# Patient Record
Sex: MALE | Race: WHITE | NOT HISPANIC OR LATINO | Employment: OTHER | URBAN - METROPOLITAN AREA
[De-identification: names, ages, dates, MRNs, and addresses within clinical notes are randomized per-mention and may not be internally consistent; named-entity substitution may affect disease eponyms.]

---

## 2019-11-03 ENCOUNTER — HOSPITAL ENCOUNTER (EMERGENCY)
Facility: CLINIC | Age: 59
Discharge: HOME | End: 2019-11-03
Attending: EMERGENCY MEDICINE | Admitting: EMERGENCY MEDICINE
Payer: COMMERCIAL

## 2019-11-03 ENCOUNTER — TRANSFERRED RECORDS (OUTPATIENT)
Dept: HEALTH INFORMATION MANAGEMENT | Facility: CLINIC | Age: 59
End: 2019-11-03

## 2019-11-03 ENCOUNTER — APPOINTMENT (OUTPATIENT)
Dept: CT IMAGING | Facility: CLINIC | Age: 59
End: 2019-11-03
Attending: EMERGENCY MEDICINE
Payer: COMMERCIAL

## 2019-11-03 VITALS
HEART RATE: 76 BPM | OXYGEN SATURATION: 97 % | RESPIRATION RATE: 18 BRPM | WEIGHT: 221 LBS | DIASTOLIC BLOOD PRESSURE: 85 MMHG | SYSTOLIC BLOOD PRESSURE: 145 MMHG | TEMPERATURE: 97.9 F

## 2019-11-03 DIAGNOSIS — I16.0 HYPERTENSIVE URGENCY: ICD-10-CM

## 2019-11-03 LAB
ANION GAP SERPL CALCULATED.3IONS-SCNC: 7 MMOL/L (ref 3–14)
BASOPHILS # BLD AUTO: 0 10E9/L (ref 0–0.2)
BASOPHILS NFR BLD AUTO: 0.5 %
BUN SERPL-MCNC: 14 MG/DL (ref 7–30)
CALCIUM SERPL-MCNC: 9.1 MG/DL (ref 8.5–10.1)
CHLORIDE SERPL-SCNC: 105 MMOL/L (ref 94–109)
CO2 SERPL-SCNC: 25 MMOL/L (ref 20–32)
CREAT SERPL-MCNC: 0.79 MG/DL (ref 0.66–1.25)
DIFFERENTIAL METHOD BLD: NORMAL
EOSINOPHIL # BLD AUTO: 0 10E9/L (ref 0–0.7)
EOSINOPHIL NFR BLD AUTO: 0.2 %
ERYTHROCYTE [DISTWIDTH] IN BLOOD BY AUTOMATED COUNT: 12.1 % (ref 10–15)
GFR SERPL CREATININE-BSD FRML MDRD: >90 ML/MIN/{1.73_M2}
GLUCOSE SERPL-MCNC: 117 MG/DL (ref 70–99)
HCT VFR BLD AUTO: 46.3 % (ref 40–53)
HGB BLD-MCNC: 15.4 G/DL (ref 13.3–17.7)
IMM GRANULOCYTES # BLD: 0 10E9/L (ref 0–0.4)
IMM GRANULOCYTES NFR BLD: 0.2 %
LYMPHOCYTES # BLD AUTO: 1.6 10E9/L (ref 0.8–5.3)
LYMPHOCYTES NFR BLD AUTO: 18.7 %
MCH RBC QN AUTO: 29.2 PG (ref 26.5–33)
MCHC RBC AUTO-ENTMCNC: 33.3 G/DL (ref 31.5–36.5)
MCV RBC AUTO: 88 FL (ref 78–100)
MONOCYTES # BLD AUTO: 0.4 10E9/L (ref 0–1.3)
MONOCYTES NFR BLD AUTO: 5.1 %
NEUTROPHILS # BLD AUTO: 6.2 10E9/L (ref 1.6–8.3)
NEUTROPHILS NFR BLD AUTO: 75.3 %
NRBC # BLD AUTO: 0 10*3/UL
NRBC BLD AUTO-RTO: 0 /100
PLATELET # BLD AUTO: 169 10E9/L (ref 150–450)
POTASSIUM SERPL-SCNC: 3.7 MMOL/L (ref 3.4–5.3)
RBC # BLD AUTO: 5.28 10E12/L (ref 4.4–5.9)
SODIUM SERPL-SCNC: 137 MMOL/L (ref 133–144)
TROPONIN I SERPL-MCNC: <0.015 UG/L (ref 0–0.04)
WBC # BLD AUTO: 8.3 10E9/L (ref 4–11)

## 2019-11-03 PROCEDURE — 85025 COMPLETE CBC W/AUTO DIFF WBC: CPT | Performed by: EMERGENCY MEDICINE

## 2019-11-03 PROCEDURE — 99285 EMERGENCY DEPT VISIT HI MDM: CPT | Mod: 25

## 2019-11-03 PROCEDURE — 84484 ASSAY OF TROPONIN QUANT: CPT | Performed by: EMERGENCY MEDICINE

## 2019-11-03 PROCEDURE — 70450 CT HEAD/BRAIN W/O DYE: CPT

## 2019-11-03 PROCEDURE — 25000128 H RX IP 250 OP 636: Performed by: EMERGENCY MEDICINE

## 2019-11-03 PROCEDURE — 80048 BASIC METABOLIC PNL TOTAL CA: CPT | Performed by: EMERGENCY MEDICINE

## 2019-11-03 PROCEDURE — 93005 ELECTROCARDIOGRAM TRACING: CPT

## 2019-11-03 PROCEDURE — 96375 TX/PRO/DX INJ NEW DRUG ADDON: CPT

## 2019-11-03 PROCEDURE — 96374 THER/PROPH/DIAG INJ IV PUSH: CPT

## 2019-11-03 PROCEDURE — 25000132 ZZH RX MED GY IP 250 OP 250 PS 637: Performed by: EMERGENCY MEDICINE

## 2019-11-03 RX ORDER — DIPHENHYDRAMINE HYDROCHLORIDE 50 MG/ML
25 INJECTION INTRAMUSCULAR; INTRAVENOUS ONCE
Status: COMPLETED | OUTPATIENT
Start: 2019-11-03 | End: 2019-11-03

## 2019-11-03 RX ORDER — HYDRALAZINE HYDROCHLORIDE 20 MG/ML
5 INJECTION INTRAMUSCULAR; INTRAVENOUS ONCE
Status: COMPLETED | OUTPATIENT
Start: 2019-11-03 | End: 2019-11-03

## 2019-11-03 RX ORDER — METOCLOPRAMIDE HYDROCHLORIDE 5 MG/ML
10 INJECTION INTRAMUSCULAR; INTRAVENOUS ONCE
Status: COMPLETED | OUTPATIENT
Start: 2019-11-03 | End: 2019-11-03

## 2019-11-03 RX ORDER — AMLODIPINE BESYLATE 2.5 MG/1
2.5 TABLET ORAL DAILY
Qty: 7 TABLET | Refills: 0 | Status: SHIPPED | OUTPATIENT
Start: 2019-11-03 | End: 2023-12-20

## 2019-11-03 RX ORDER — KETOROLAC TROMETHAMINE 15 MG/ML
15 INJECTION, SOLUTION INTRAMUSCULAR; INTRAVENOUS ONCE
Status: COMPLETED | OUTPATIENT
Start: 2019-11-03 | End: 2019-11-03

## 2019-11-03 RX ORDER — AMLODIPINE BESYLATE 5 MG/1
5 TABLET ORAL ONCE
Status: COMPLETED | OUTPATIENT
Start: 2019-11-03 | End: 2019-11-03

## 2019-11-03 RX ORDER — ACETAMINOPHEN 500 MG
1000 TABLET ORAL ONCE
Status: COMPLETED | OUTPATIENT
Start: 2019-11-03 | End: 2019-11-03

## 2019-11-03 RX ADMIN — HYDRALAZINE HYDROCHLORIDE 5 MG: 20 INJECTION INTRAMUSCULAR; INTRAVENOUS at 16:40

## 2019-11-03 RX ADMIN — AMLODIPINE BESYLATE 5 MG: 5 TABLET ORAL at 17:41

## 2019-11-03 RX ADMIN — DIPHENHYDRAMINE HYDROCHLORIDE 25 MG: 50 INJECTION, SOLUTION INTRAMUSCULAR; INTRAVENOUS at 20:54

## 2019-11-03 RX ADMIN — ACETAMINOPHEN 1000 MG: 500 TABLET, FILM COATED ORAL at 19:29

## 2019-11-03 RX ADMIN — KETOROLAC TROMETHAMINE 15 MG: 15 INJECTION, SOLUTION INTRAMUSCULAR; INTRAVENOUS at 20:54

## 2019-11-03 RX ADMIN — METOCLOPRAMIDE 10 MG: 5 INJECTION, SOLUTION INTRAMUSCULAR; INTRAVENOUS at 20:54

## 2019-11-03 ASSESSMENT — ENCOUNTER SYMPTOMS
SHORTNESS OF BREATH: 0
PALPITATIONS: 1
VOMITING: 1
WEAKNESS: 0
SPEECH DIFFICULTY: 0
HEADACHES: 1
NUMBNESS: 0

## 2019-11-03 NOTE — ED PROVIDER NOTES
History     Chief Complaint:  HTN    HPI   Román Drake is a very pleasant 59-year-old male with a history of hypertension who presents to the emergency department accompanied by his wife for evaluation of elevated blood pressure from urgent care.  Per family report, the patient has been maintained on lisinopril, 10 mg daily, for the past 4 to 5 years.  Earlier this summer, he experienced a cough, which in hindsight, felt was related to a viral URI.  Given concern for ACE inhibitor side effect, this was subsequently discontinued and he was transitioned to losartan.  Over the past 2 months, he has been noted to have elevated blood pressures.  Last week, on Thursday, he was transitioned back to lisinopril, though is now taking 20 mg daily.  He typically takes this in the morning.  Over the past few months, the patient has been experiencing intermittent headaches.  He describes them as a throbbing sensation, typically localized to the back of his head, wrapping around towards the front and the forehead.  He correlates these headaches as occurring in conjunction with elevated blood pressure readings.  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  Yesterday, he worked outside raking leaves, and today, developed worsening headache.  His headache is described as similar in location and character to previous headaches.  It was gradual in onset, waxing and waning in intensity over the course of the day.  It was not described as a sudden onset thunderclap headache.  He did experience one episode of emesis earlier today.  He denies associated vision loss, numbness, weakness, or slurred speech.  He has no personal or family history of cerebral aneurysm.  He denies any increased stressors in his life.  He also notes sometimes consuming food exacerbates his headache, though denies significant salt consumption.  He denies any other  associated symptoms including chest pain, chest pressure, palpitations, shortness of breath, or abdominal pain.  No other concerns are voiced at this time.    Allergies:  No Known Drug Allergies     Medications:    Lisinopril    Past Medical History:    HTN    Past Surgical History:    Surgical history reviewed. No pertinent surgical history.     Family History:    Mother: HTN  No familial history of brain aneurysms    Social History:  The patient was accompanied to the ED by wife.  Smoking Status: Never Smoker  Smokeless Tobacco: Never Used  Alcohol Use: Negative  Marital Status:      Review of Systems   Eyes: Negative for visual disturbance.   Respiratory: Negative for shortness of breath.    Cardiovascular: Positive for palpitations. Negative for chest pain.   Gastrointestinal: Positive for vomiting.   Neurological: Positive for headaches. Negative for speech difficulty, weakness and numbness.   All other systems reviewed and are negative.      Physical Exam     Patient Vitals for the past 24 hrs:   BP Temp Temp src Pulse Heart Rate Resp SpO2 Weight   11/03/19 2145 (!) 152/84 -- -- 80 79 15 96 % --   11/03/19 2130 (!) 159/91 -- -- 78 77 16 97 % --   11/03/19 2115 (!) 168/91 -- -- 80 77 17 97 % --   11/03/19 2100 (!) 180/102 -- -- 78 86 17 99 % --   11/03/19 2045 (!) 171/96 -- -- 75 74 17 97 % --   11/03/19 2015 (!) 187/97 -- -- 78 76 11 98 % --   11/03/19 1945 (!) 160/91 -- -- 73 71 18 98 % --   11/03/19 1930 (!) 175/99 -- -- 73 86 14 98 % --   11/03/19 1915 (!) 184/95 -- -- 81 71 12 99 % --   11/03/19 1900 (!) 177/97 -- -- 70 70 14 98 % --   11/03/19 1845 (!) 177/96 -- -- 66 63 28 100 % --   11/03/19 1830 (!) 195/104 -- -- 77 82 22 100 % --   11/03/19 1815 (!) 187/99 -- -- 69 63 -- 100 % --   11/03/19 1800 (!) 187/105 -- -- 71 64 15 100 % --   11/03/19 1745 (!) 200/106 -- -- 82 58 13 -- --   11/03/19 1730 (!) 176/92 -- -- 67 56 16 98 % --   11/03/19 1715 (!) 187/110 -- -- 66 73 15 99 % --   11/03/19 1700  (!) 171/100 -- -- 66 67 16 98 % --   11/03/19 1645 (!) 176/99 -- -- 60 68 29 96 % --   11/03/19 1640 (!) 182/100 -- -- -- -- -- -- --   11/03/19 1630 (!) 182/100 -- -- 69 61 -- 98 % --   11/03/19 1615 (!) 182/103 -- -- 66 72 -- 99 % --   11/03/19 1613 (!) 201/102 97.9  F (36.6  C) Oral 68 -- 16 98 % 100.2 kg (221 lb)      Physical Exam  General:              Well-nourished              Speaking in full sentences   Resting on the gurney, answering questions appropriately  Eyes:              Conjunctiva without injection or scleral icterus              Pupils 3 mm bilaterally, reactive to light  ENT:              Moist mucous membranes              Nares patent              Pinnae normal  Neck:              Full ROM              No stiffness appreciated  Resp:              Lungs CTAB              No crackles, wheezing or audible rubs              Good air movement  CV:                    Normal rate, regular rhythm              S1 and S2 present              No murmur, gallop or rub  GI:              BS present              Abdomen soft without distention              Non-tender to light and deep palpation              No guarding or rebound tenderness  Skin:              Warm, dry, well perfused              No rashes or open wounds on exposed skin  MSK:              Moves all extremities              No focal deformities or swelling  Neuro:              Alert              CN III-XII intact              5/5  strength              5/5 ankle dorsi/plantarflexion              SILT in BUE and BLE              Answers questions appropriately              Moves all extremities equally              Gait stable  Psych:              Normal affect, normal mood    Emergency Department Course     ECG:  ECG taken at 1608, ECG read at 1618  Normal sinus rhythm  Normal ECG  Rate 74 bpm. PA interval 192 ms. QRS duration 102 ms. QT/QTc 408/452 ms. P-R-T axes 49 43 31.    Imaging:  Radiology findings were communicated with the  patient who voiced understanding of the findings.    Head CT w/o contrast  1.  No acute intracranial abnormality  2.  Small moderate right mastoid effusion.  Reading per radiology     Laboratory:  Laboratory findings were communicated with the patient who voiced understanding of the findings.    CBC: WBC 8.3, HGB 15.4,   BMP: glucose 117 (H), o/w WNL (Creatinine 0.79)  Troponin (Collected 1616): <0.015     Interventions:  1640 Apresoline 5 mg IV  1741 Norvasc 5 mg PO  1929 Tylenol 1000 mg PO  2054 Benadryl 25 mg IV  2054 Reglan 10 mg IV  2054 Toradol 15 mg IV    Emergency Department Course:    1609 Nursing notes and vitals reviewed.    1618 I performed an exam of the patient as documented above.     1608 EKG obtained as noted above.     1616 IV was inserted and blood was drawn for laboratory testing, results above.     1710 Patient rechecked and updated.      1907 Patient rechecked and updated.      1956 The patient was sent for a CT while in the emergency department, results above.      2213 Patient rechecked and updated. The patient's headache is resolved.    Impression & Plan      Medical Decision Making:  Román Drake is a 59-year-old male presenting to the ER from urgent care for evaluation of a headache.  VS on presentation notable for elevated BP of 201/102.  Clinical history and exam most consistent with hypertensive urgency.  Patient has had waxing and waning elevated blood pressures over the past few months according to family, with recent dose adjustment and medication change back to lisinopril.  Patient's headache correlates with rises in blood pressure, which are also triggered by working outside the days preceding his headache.  His headache is described as gradual in onset rather than a sudden onset, thunderclap headache.  There is no personal or family history of cerebral aneurysm and I feel this is unlikely to represent subarachnoid hemorrhage.  He denies any symptoms of vision loss, numbness,  weakness to suggest cerebral ischemia.  He also denies any symptoms of chest pain or chest pressure.  EKG demonstrates sinus rhythm without findings of acute ischemia.  His troponin returned undetectable.  Labs demonstrate unremarkable renal function, and are otherwise unremarkable.  During patient's emergency department course, his blood pressure initially and headache remained somewhat persistent.  Noncontrast head CT was obtained, which reveals no evidence of acute intracranial pathology.  We discussed the incidental findings of fluid within right mastoid process, though do not feel this is consistent with mastoiditis.  He was provided 1 dose of hydralazine, as well as 1 dose of oral amlodipine.  He was additionally provided medication to assist with headaches, which he achieved great relief in symptoms from.  His blood pressure also improved in conjunction with this.  I do not feel his headache is consistent with other pathology such as giant cell arteritis given the above history and ED course.  Additionally, this is not consistent with meningitis nor encephalitis.  I discussed with the patient and wife present at bedside the importance of close follow-up.  There open to establishing care with a internal medicine specialist.  Referral information was provided for [facility] options as well as within the [facility].   At this time, I feel it would be reasonable to continue on lisinopril as previously prescribed, he will also begin low-dose amlodipine.  I discussed with the family side effects of this medication including peripheral edema.  They are to follow-up early this coming week in 1 to 2 days for recheck of blood pressure, and reevaluation of symptoms with their primary care physician.  Further dose adjustments or medication adjustments should be at the direction of their PCP.  Both the patient and wife verbalized understanding of this and felt comfortable with this plan of care.  Given the  improvement in symptoms, improvements in blood pressure, and results of the above studies, with reasonable clinical certainty I feel patient is stable for discharge home with very close outpatient follow-up.  Strict return precautions discussed including persistently elevated blood pressure readings greater than 180/100, or recurrent symptoms including severe headache, chest pain, new neurologic deficits, or any other concerns.  All of their questions were answered prior to discharge.    Diagnosis:    ICD-10-CM    1. Hypertensive urgency I16.0      Disposition:   The patient is discharged to home.     Discharge Medications:  New Prescriptions    AMLODIPINE (NORVASC) 2.5 MG TABLET    Take 1 tablet (2.5 mg) by mouth daily for 7 days     Scribe Disclosure:  I, [place] Meenakshi, am serving as a scribe at 4:16 PM on 11/3/2019 to document services personally performed by Jose Calixto MD based on my observations and the provider's statements to me.  [facility] EMERGENCY DEPARTMENT       Jose Calixto MD  11/03/19 5439       Jose Calixto MD  11/03/19 3701

## 2019-11-03 NOTE — DISCHARGE INSTRUCTIONS
Follow-up:  Please follow-up with your primary care provider in 2 days for re-evaluation and discussion of your visit to the emergency department today.    Please contact the [facility] as recommended tomorrow for close follow-up    Home treatments:  Recommended home therapies include continue with BP log, take medications as directed and close follow-up.    New prescriptions:  Amlodipine    Return precautions:  Warning signs which should prompt you to return to the ER include worsening headache, vomiting, vision changes, numbness/weakness, or any other new or troubling symptoms.  We are always happy to see you again.    Discharge Instructions  Hypertension - High Blood Pressure    During you visit to the Emergency Department, your blood pressure was higher than the recommended blood pressure.  This may be related to stress, pain, medication or other temporary conditions. In these cases, your blood pressure may return to normal on its own. If you have a history of high blood pressure, you may need to have your provider adjust your medications. Sometimes, your high measurement here may indicate that you have developed high blood pressure that will stay high unless it is treated. As a general rule, high blood pressure causes problems over years rather than days, weeks, or months. So, while it is important to treat blood pressure, it is rarely important to treat blood pressure immediately. Occasionally we will begin a medication in the Emergency Department; more often we will recommend close follow-up for medications with a primary doctor/clinic.    Generally, every Emergency Department visit should have a follow-up clinic visit with either a primary or a specialty clinic/provider. Please follow-up as instructed by your emergency provider today.    Return to the Emergency Department if you start to have:  A severe headache.  Chest pain.  Shortness of breath.  Weakness or numbness that affects one part of the  body.  Confusion.  Vision changes.  Significant swelling of legs and/or eyes.  A reaction to any medication started in the Emergency Department.    What can I do to help myself?  Avoid alcohol.  Take any blood pressure medicine that you are prescribed.  Get a good night s sleep.  Lower your salt intake.  Exercise.  Lose weight.  Manage stress.  See your doctor regularly    If blood pressure medication was started in the Emergency Department:  The medicine may not have an immediate effect. The body and brain determine what blood pressure you have. The medicine s job is to retrain the body s  thermostat  to a lower blood pressure.  You will need to follow up with your provider to see how this medicine is working for you.  If you were given a prescription for medicine here today, be sure to read all of the information (including the package insert) that comes with your prescription.  This will include important information about the medicine, its side effects, and any warnings that you need to know about.  The pharmacist who fills the prescription can provide more information and answer questions you may have about the medicine.  If you have questions or concerns that the pharmacist cannot address, please call or return to the Emergency Department.   Remember that you can always come back to the Emergency Department if you are not able to see your regular provider in the amount of time listed above, if you get any new symptoms, or if there is anything that worries you.

## 2019-11-03 NOTE — ED AVS SNAPSHOT
[facility] Emergency Department  [address]  [facility] [address]  Phone:  [phone]  Fax:  [phone]                                    Román Drake   MRN: [id number]    Department:  [facility] Emergency Department   Date of Visit:  11/3/2019           After Visit Summary Signature Page    I have received my discharge instructions, and my questions have been answered. I have discussed any challenges I see with this plan with the nurse or doctor.    ..........................................................................................................................................  Patient/Patient Representative Signature      ..........................................................................................................................................  Patient Representative Print Name and Relationship to Patient    ..................................................               ................................................  Date                                   Time    ..........................................................................................................................................  Reviewed by Signature/Title    ...................................................              ..............................................  Date                                               Time          22EPIC Rev 08/18

## 2019-11-04 LAB — INTERPRETATION ECG - MUSE: NORMAL

## 2021-10-02 NOTE — ED TRIAGE NOTES
"59 year old male complains of \"splitting\" headache, sent from  for hypertension. GCS 15. ABCs intact.    Zeynep Wong RN on 11/3/2019 at 4:13 PM    "
generalized weakness

## 2023-10-27 ENCOUNTER — NURSE TRIAGE (OUTPATIENT)
Dept: NURSING | Facility: CLINIC | Age: 63
End: 2023-10-27
Payer: COMMERCIAL

## 2023-10-28 NOTE — TELEPHONE ENCOUNTER
"Román reports Rt Upper Abdominal pain that started ~7:30 pm and has progressively worsened    - Rt abdomen, just under rib cage  - Radiates to back  - per spouse, \"doubled over in pain\"    ER advised -  [facility] suggested  They are looking into in-network facilities with their insurance    KEVIN Vaz [facility] Nurse [facility]visors      Reason for Disposition   [1] Pain lasts > 10 minutes AND [2] age > 50    Additional Information   Negative: SEVERE difficulty breathing (e.g., struggling for each breath, speaks in single words)   Negative: Shock suspected (e.g., cold/pale/clammy skin, too weak to stand, low BP, rapid pulse)   Negative: Difficult to awaken or acting confused (e.g., disoriented, slurred speech)   Negative: Passed out (i.e., lost consciousness, collapsed and was not responding)   Negative: Visible sweat on face or sweat dripping down face   Negative: Sounds like a life-threatening emergency to the triager   Negative: [1] SEVERE pain (e.g., excruciating) AND [2] present > 1 hour    Protocols used: Abdominal Pain - Upper-A-AH    "

## 2023-10-30 ENCOUNTER — ANCILLARY PROCEDURE (OUTPATIENT)
Dept: RADIOLOGY | Facility: CLINIC | Age: 63
End: 2023-10-30
Payer: COMMERCIAL

## 2023-11-01 ENCOUNTER — TRANSCRIBE ORDERS (OUTPATIENT)
Dept: OTHER | Age: 63
End: 2023-11-01

## 2023-11-01 DIAGNOSIS — K81.9 CHOLECYSTITIS: Primary | ICD-10-CM

## 2023-11-02 ENCOUNTER — TELEPHONE (OUTPATIENT)
Dept: SURGERY | Facility: CLINIC | Age: 63
End: 2023-11-02

## 2023-11-02 ENCOUNTER — HOSPITAL ENCOUNTER (OUTPATIENT)
Facility: AMBULATORY SURGERY CENTER | Age: 63
End: 2023-11-02
Attending: SURGERY

## 2023-11-02 ENCOUNTER — OFFICE VISIT (OUTPATIENT)
Dept: SURGERY | Facility: CLINIC | Age: 63
End: 2023-11-02

## 2023-11-02 VITALS — WEIGHT: 200 LBS

## 2023-11-02 DIAGNOSIS — K81.9 CHOLECYSTITIS: ICD-10-CM

## 2023-11-02 PROCEDURE — 99243 OFF/OP CNSLTJ NEW/EST LOW 30: CPT | Performed by: SURGERY

## 2023-11-02 RX ORDER — ASPIRIN 81 MG/1
81 TABLET ORAL DAILY
COMMUNITY

## 2023-11-02 RX ORDER — CHLORAL HYDRATE 500 MG
2000 CAPSULE ORAL
COMMUNITY

## 2023-11-02 RX ORDER — ACETAMINOPHEN 325 MG/1
975 TABLET ORAL ONCE
Status: CANCELLED | OUTPATIENT
Start: 2023-11-02 | End: 2023-11-02

## 2023-11-02 RX ORDER — BUTALB/ACETAMINOPHEN/CAFFEINE 50-325-40
2 TABLET ORAL DAILY
COMMUNITY

## 2023-11-02 RX ORDER — LISINOPRIL 20 MG/1
10 TABLET ORAL DAILY
COMMUNITY
Start: 2023-07-13 | End: 2024-07-12

## 2023-11-02 NOTE — LETTER
11/2/2023         RE: Román Drake  [address] [facility] [address]        Dear Colleague,    Thank you for referring your patient, Román Drake, to the [facility] [place]. Please see a copy of my visit note below.    HPI: Román Drake is a 63 year old male referred to see me by Clinic, [facility] for biliary colic.  He notes several days of waxing and waning right upper quadrant pain that began this past Friday.  With each episode it tended to occur following a meal, with nausea present during more severe bouts of the pain.  Since eliminating fatty foods from his diet over the past few days he has not had any further symptoms.  He did note that when urinating on Saturday night he had a slight increase in the right upper quadrant pain, but no penile pain.  He did present to the urgent care for evaluation where he underwent both urinalysis and ultrasound evaluation, which was noteworthy for gallstones.    Allergies:Patient has no known allergies.    Prior Medical History: History reviewed. No pertinent past medical history.    Prior Surgical History:   Past Surgical History:   Procedure Laterality Date     C ANESTH,EAR SURGERY      age 7       CURRENT MEDS:  Prior to Admission medications    Medication Sig Start Date End Date Taking? Authorizing Provider   aspirin 81 MG EC tablet Take 81 mg by mouth daily   Yes Reported, Patient   Coenzyme Q10 (CO Q-10) 30 MG CAPS Take 2 capsules by mouth daily   Yes Reported, Patient   lisinopril (ZESTRIL) 20 MG tablet Take 10 mg by mouth daily 7/13/23 7/12/24 Yes Reported, Patient   lysine 500 MG TABS    Yes Reported, Patient   amLODIPine (NORVASC) 2.5 MG tablet Take 1 tablet (2.5 mg) by mouth daily for 7 days 11/3/19 11/10/19  Jose Calixto MD   fish oil-omega-3 fatty acids 1000 MG capsule Take 2,000 mg by mouth  Patient not taking: Reported on 11/2/2023    Reported, Patient         History  reviewed. No pertinent family history.     reports that he has never smoked. He has never used smokeless tobacco. He reports that he does not currently use alcohol.    History   Smoking Status     Never   Smokeless Tobacco     Never       Review of Systems -    The 10 point review of systems is within normal limits except as noted in HPI.    Wt 90.7 kg (200 lb)   200 lbs 0 oz  There is no height or weight on file to calculate BMI.    EXAM:  GENERAL: Alert, cooperative, appears stated age  ABDOMEN: Soft, nondistended.  Slight discomfort in the right upper quadrant just below the right costal margin  Heart: Regular rate and rhythm  Lungs: Normal respiratory effort    LABS:  Lab Results   Component Value Date    HGB 15.4 11/03/2019    WBC 8.3 11/03/2019     11/03/2019           Assessment/Plan:   1. Cholecystitis          Román Drake is a 63 year old male with signs and symptoms consistent with biliary colic.  I have explained the pathophysiology of gallstone formation and progression to symptomatic disease in detail as well as the surgical versus non-operative management strategies.      The risks of surgery were discussed in detail which include, but are not limited to, bleeding, infection, injury to surrounding structures.  Additionally, the risks of non operative management were discussed which include, but are not limited to, recurrent pain or worsening symptoms    He understands everything which was discussed and has consented to proceed with a laparoscopic cholecystectomy.  Surgery will be scheduled at a time that works for the patient.      20 minutes spent on the date of the encounter doing chart review, review of outside records, patient visit, and documentation    Jean Gilliland MD ,MD  [facility] Department of Surgery      Again, thank you for allowing me to participate in the care of your patient.        Sincerely,        Jean Gilliland MD

## 2023-11-02 NOTE — TELEPHONE ENCOUNTER
Spoke with patient today regarding surgery scheduling     Went over details/instructions.    Surgery Letter given to patient in clinic.  (Please see LETTERS TAB in chart to retrieve a copy of this letter)

## 2023-11-02 NOTE — LETTER
Pre-op Physical: To be done by Dr Gilliland day of procedure.    Surgery Date: 11/27/2023     Location: [place] [address]    Approximate Arrival Time: 11:00am  (Unless instructed differently by the pre-op call nurse)     Pre-Surgical Tasks:     Review all medications with your primary care or prescribing physician; they will advise you which meds to stop and when, and when you can resume taking.  Certain medications like blood thinners and weight loss medications need to be stopped in advance of surgery to proceed safely.      Blood thinners including but not exclusive to drugs like Xarelto, Eliquis, Warfarin and Aspirin, should be stopped five days before surgery, if your prescribing provider agrees. Follow your provider's advice on stopping blood thinners because they know you best.  If you are unsure if your medication is a blood thinner, ask your prescribing provider.    Weight loss medications: There are multiple medications being used for weight management and diabetes today, and the list is growing.  Phentermine, Ozempic, Wegovy, Trulicity, and other similar medications need to be stopped one week before surgery to avoid being cancelled.  Victoza and Saxenda can be continued longer but must be stopped one full day before surgery.  Please ask your prescribing provider for advice.    Diabetic medications: in addition to the medications talked about above that are used for either weight loss or diabetes, some people are on insulin that may require adjustment.  Please discuss managing diabetic medications with your prescribing doctor as these medications may require modification prior to surgery.     Please shower the evening before and morning of surgery with Hibiclens soap.  This can be found at your local pharmacy.     Fasting instructions will be provided by the pre-op nurse who will call you 1-3 days before surgery.  Typically, we advise normal food  up to 8 hours before you arrive for surgery. Clear liquids only from then until 2 hours before you arrive surgery, then nothing at all by mouth.  The nurse will review your specific instructions with you at the call.      Smoking impacts your body's ability to heal properly so we advise patients to quit if possible before surgery.  Plastic Surgery patients are required to be nicotine free for at least 8 weeks before surgery.      You will need an adult to drive you home and stay with you 24 hours after surgery. Public transportation or Medical Van Services are not permitted.    Visitor restrictions are subject to change, please verify with the pre-op nurse when they call how many people are permitted to accompany you.    We always encourage you to notify your insurance any time you have medical tests or procedures scheduled including surgery. The number is usually right on the back of your insurance card. To obtain pricing for surgery, please call  [facility] Cost of Care at [phone] or email [email or web address].        Call our office if you have any questions! Thank you!

## 2023-11-02 NOTE — LETTER
Pre-op Physical: To be done by Dr Gilliland day of procedure.    Surgery Date: 12/20/2023     Location: [place] [address]    Approximate Arrival Time: 12:45 (Unless instructed differently by the pre-op call nurse)     Pre-Surgical Tasks:     Review all medications with your primary care or prescribing physician; they will advise you which meds to stop and when, and when you can resume taking.  Certain medications like blood thinners and weight loss medications need to be stopped in advance of surgery to proceed safely.      Blood thinners including but not exclusive to drugs like Xarelto, Eliquis, Warfarin and Aspirin, should be stopped five days before surgery, if your prescribing provider agrees. Follow your provider's advice on stopping blood thinners because they know you best.  If you are unsure if your medication is a blood thinner, ask your prescribing provider.    Weight loss medications: There are multiple medications being used for weight management and diabetes today, and the list is growing.  Phentermine, Ozempic, Wegovy, Trulicity, and other similar medications need to be stopped one week before surgery to avoid being cancelled.  Victoza and Saxenda can be continued longer but must be stopped one full day before surgery.  Please ask your prescribing provider for advice.    Diabetic medications: in addition to the medications talked about above that are used for either weight loss or diabetes, some people are on insulin that may require adjustment.  Please discuss managing diabetic medications with your prescribing doctor as these medications may require modification prior to surgery.     Please shower the evening before and morning of surgery with Hibiclens soap.  This can be found at your local pharmacy.     Fasting instructions will be provided by the pre-op nurse who will call you 1-3 days before surgery.  Typically, we advise normal food up  to 8 hours before you arrive for surgery. Clear liquids only from then until 2 hours before you arrive surgery, then nothing at all by mouth.  The nurse will review your specific instructions with you at the call.      Smoking impacts your body's ability to heal properly so we advise patients to quit if possible before surgery.  Plastic Surgery patients are required to be nicotine free for at least 8 weeks before surgery.      You will need an adult to drive you home and stay with you 24 hours after surgery. Public transportation or Medical Van Services are not permitted.    Visitor restrictions are subject to change, please verify with the pre-op nurse when they call how many people are permitted to accompany you.    We always encourage you to notify your insurance any time you have medical tests or procedures scheduled including surgery. The number is usually right on the back of your insurance card. To obtain pricing for surgery, please call [facility] Cost of Care at [phone] or email [email or web address].        Call our office if you have any questions! Thank you!

## 2023-11-02 NOTE — PROGRESS NOTES
HPI: Román Drake is a 63 year old male referred to see me by Clinic, [facility] for biliary colic.  He notes several days of waxing and waning right upper quadrant pain that began this past Friday.  With each episode it tended to occur following a meal, with nausea present during more severe bouts of the pain.  Since eliminating fatty foods from his diet over the past few days he has not had any further symptoms.  He did note that when urinating on Saturday night he had a slight increase in the right upper quadrant pain, but no penile pain.  He did present to the urgent care for evaluation where he underwent both urinalysis and ultrasound evaluation, which was noteworthy for gallstones.    Allergies:Patient has no known allergies.    Prior Medical History: History reviewed. No pertinent past medical history.    Prior Surgical History:   Past Surgical History:   Procedure Laterality Date    ZZC ANESTH,EAR SURGERY      age 7       CURRENT MEDS:  Prior to Admission medications    Medication Sig Start Date End Date Taking? Authorizing Provider   aspirin 81 MG EC tablet Take 81 mg by mouth daily   Yes Reported, Patient   Coenzyme Q10 (CO Q-10) 30 MG CAPS Take 2 capsules by mouth daily   Yes Reported, Patient   lisinopril (ZESTRIL) 20 MG tablet Take 10 mg by mouth daily 7/13/23 7/12/24 Yes Reported, Patient   lysine 500 MG TABS    Yes Reported, Patient   amLODIPine (NORVASC) 2.5 MG tablet Take 1 tablet (2.5 mg) by mouth daily for 7 days 11/3/19 11/10/19  Jose Calixto MD   fish oil-omega-3 fatty acids 1000 MG capsule Take 2,000 mg by mouth  Patient not taking: Reported on 11/2/2023    Reported, Patient         History reviewed. No pertinent family history.     reports that he has never smoked. He has never used smokeless tobacco. He reports that he does not currently use alcohol.    History   Smoking Status    Never   Smokeless Tobacco    Never       Review of Systems -    The 10 point review of  systems is within normal limits except as noted in HPI.    Wt 90.7 kg (200 lb)   200 lbs 0 oz  There is no height or weight on file to calculate BMI.    EXAM:  GENERAL: Alert, cooperative, appears stated age  ABDOMEN: Soft, nondistended.  Slight discomfort in the right upper quadrant just below the right costal margin  Heart: Regular rate and rhythm  Lungs: Normal respiratory effort    LABS:  Lab Results   Component Value Date    HGB 15.4 11/03/2019    WBC 8.3 11/03/2019     11/03/2019           Assessment/Plan:   1. Cholecystitis          Román Drake is a 63 year old male with signs and symptoms consistent with biliary colic.  I have explained the pathophysiology of gallstone formation and progression to symptomatic disease in detail as well as the surgical versus non-operative management strategies.      The risks of surgery were discussed in detail which include, but are not limited to, bleeding, infection, injury to surrounding structures.  Additionally, the risks of non operative management were discussed which include, but are not limited to, recurrent pain or worsening symptoms    He understands everything which was discussed and has consented to proceed with a laparoscopic cholecystectomy.  Surgery will be scheduled at a time that works for the patient.      20 minutes spent on the date of the encounter doing chart review, review of outside records, patient visit, and documentation    Jean Gilliland MD ,MD  [facility] Department of Surgery

## 2023-11-10 NOTE — TELEPHONE ENCOUNTER
"Received the following information from [facility] via email:    \"Pt's insurnace is a [employer] [facility] Medical Sharing \"medi-share is not insurance, it does not guarantee that eligible medical bills will be shared or otherwise paid. Medi-share is not a discount card or program\"    If pt wants to proceed at [facility], pt surgery will be self-pay and pt can submit for payment from [employer]. Amount due will be $1365.00. The hospital may have different options for billing. Please connect with pt and see how pt wants to proceed.\"    I called and spoke with patient about the above information.  Reading to him verbatim what the entire message.  Patient is aware the plan is a cost sharing plan and he will be responsible for \"some form of payment\".  Relayed 2x that the amount due will be 1365.00 at time of service.  That [facility] does not offer any sort of payment plans that I'm aware of and that payment will be due in full at time of check in.  Patient expressed understanding several times, thanked for the information and stated he will have payment in full prepared at time of check in. He does wish at this time to proceed as scheduled.    Call was ended.    "

## 2023-11-21 NOTE — TELEPHONE ENCOUNTER
"Patient reached out to me this afternoon after I received and email from [facility] that he needed to cancel his procedure.  Patient states at this time he has \"had some things come up and 11/27 will not work for him\".  Was able to get patient rescheduled with Dr Gilliland for 12/20/2023.  New letter sent via US Mail.  "

## 2023-12-19 ENCOUNTER — ANESTHESIA EVENT (OUTPATIENT)
Dept: SURGERY | Facility: AMBULATORY SURGERY CENTER | Age: 63
End: 2023-12-19

## 2023-12-20 ENCOUNTER — ANESTHESIA (OUTPATIENT)
Dept: SURGERY | Facility: AMBULATORY SURGERY CENTER | Age: 63
End: 2023-12-20

## 2023-12-20 ENCOUNTER — HOSPITAL ENCOUNTER (OUTPATIENT)
Facility: AMBULATORY SURGERY CENTER | Age: 63
Discharge: HOME | End: 2023-12-20
Attending: SURGERY

## 2023-12-20 VITALS
TEMPERATURE: 97.1 F | OXYGEN SATURATION: 96 % | BODY MASS INDEX: 28.63 KG/M2 | HEIGHT: 70 IN | SYSTOLIC BLOOD PRESSURE: 171 MMHG | HEART RATE: 81 BPM | RESPIRATION RATE: 16 BRPM | WEIGHT: 200 LBS | DIASTOLIC BLOOD PRESSURE: 79 MMHG

## 2023-12-20 DIAGNOSIS — K81.9 CHOLECYSTITIS: ICD-10-CM

## 2023-12-20 PROCEDURE — 47562 LAPAROSCOPIC CHOLECYSTECTOMY: CPT | Performed by: SURGERY

## 2023-12-20 RX ORDER — PROPOFOL 10 MG/ML
INJECTION, EMULSION INTRAVENOUS PRN
Status: DISCONTINUED | OUTPATIENT
Start: 2023-12-20 | End: 2023-12-20

## 2023-12-20 RX ORDER — PROPOFOL 10 MG/ML
INJECTION, EMULSION INTRAVENOUS CONTINUOUS PRN
Status: DISCONTINUED | OUTPATIENT
Start: 2023-12-20 | End: 2023-12-20

## 2023-12-20 RX ORDER — LIDOCAINE HYDROCHLORIDE 20 MG/ML
INJECTION, SOLUTION INFILTRATION; PERINEURAL PRN
Status: DISCONTINUED | OUTPATIENT
Start: 2023-12-20 | End: 2023-12-20

## 2023-12-20 RX ORDER — BUPIVACAINE HYDROCHLORIDE 2.5 MG/ML
INJECTION, SOLUTION INFILTRATION; PERINEURAL PRN
Status: DISCONTINUED | OUTPATIENT
Start: 2023-12-20 | End: 2023-12-20 | Stop reason: HOSPADM

## 2023-12-20 RX ORDER — ONDANSETRON 4 MG/1
4 TABLET, ORALLY DISINTEGRATING ORAL EVERY 30 MIN PRN
Status: DISCONTINUED | OUTPATIENT
Start: 2023-12-20 | End: 2023-12-21 | Stop reason: HOSPADM

## 2023-12-20 RX ORDER — ONDANSETRON 2 MG/ML
4 INJECTION INTRAMUSCULAR; INTRAVENOUS EVERY 30 MIN PRN
Status: DISCONTINUED | OUTPATIENT
Start: 2023-12-20 | End: 2023-12-21 | Stop reason: HOSPADM

## 2023-12-20 RX ORDER — GLYCOPYRROLATE 0.2 MG/ML
INJECTION, SOLUTION INTRAMUSCULAR; INTRAVENOUS PRN
Status: DISCONTINUED | OUTPATIENT
Start: 2023-12-20 | End: 2023-12-20

## 2023-12-20 RX ORDER — TRAMADOL HYDROCHLORIDE 50 MG/1
50 TABLET ORAL ONCE
Status: COMPLETED | OUTPATIENT
Start: 2023-12-20 | End: 2023-12-20

## 2023-12-20 RX ORDER — FENTANYL CITRATE 0.05 MG/ML
50 INJECTION, SOLUTION INTRAMUSCULAR; INTRAVENOUS EVERY 5 MIN PRN
Status: DISCONTINUED | OUTPATIENT
Start: 2023-12-20 | End: 2023-12-21 | Stop reason: HOSPADM

## 2023-12-20 RX ORDER — SODIUM CHLORIDE, SODIUM LACTATE, POTASSIUM CHLORIDE, CALCIUM CHLORIDE 600; 310; 30; 20 MG/100ML; MG/100ML; MG/100ML; MG/100ML
INJECTION, SOLUTION INTRAVENOUS CONTINUOUS
Status: DISCONTINUED | OUTPATIENT
Start: 2023-12-20 | End: 2023-12-21 | Stop reason: HOSPADM

## 2023-12-20 RX ORDER — OXYCODONE HYDROCHLORIDE 5 MG/1
5 TABLET ORAL
Status: DISCONTINUED | OUTPATIENT
Start: 2023-12-20 | End: 2023-12-21 | Stop reason: HOSPADM

## 2023-12-20 RX ORDER — OXYCODONE HYDROCHLORIDE 10 MG/1
10 TABLET ORAL
Status: DISCONTINUED | OUTPATIENT
Start: 2023-12-20 | End: 2023-12-21 | Stop reason: HOSPADM

## 2023-12-20 RX ORDER — NEOSTIGMINE METHYLSULFATE 1 MG/ML
VIAL (ML) INJECTION PRN
Status: DISCONTINUED | OUTPATIENT
Start: 2023-12-20 | End: 2023-12-20

## 2023-12-20 RX ORDER — DEXAMETHASONE SODIUM PHOSPHATE 4 MG/ML
INJECTION, SOLUTION INTRA-ARTICULAR; INTRALESIONAL; INTRAMUSCULAR; INTRAVENOUS; SOFT TISSUE PRN
Status: DISCONTINUED | OUTPATIENT
Start: 2023-12-20 | End: 2023-12-20

## 2023-12-20 RX ORDER — FENTANYL CITRATE 0.05 MG/ML
25 INJECTION, SOLUTION INTRAMUSCULAR; INTRAVENOUS EVERY 5 MIN PRN
Status: DISCONTINUED | OUTPATIENT
Start: 2023-12-20 | End: 2023-12-21 | Stop reason: HOSPADM

## 2023-12-20 RX ORDER — MEPERIDINE HYDROCHLORIDE 25 MG/ML
12.5 INJECTION INTRAMUSCULAR; INTRAVENOUS; SUBCUTANEOUS EVERY 5 MIN PRN
Status: DISCONTINUED | OUTPATIENT
Start: 2023-12-20 | End: 2023-12-21 | Stop reason: HOSPADM

## 2023-12-20 RX ORDER — KETOROLAC TROMETHAMINE 30 MG/ML
INJECTION, SOLUTION INTRAMUSCULAR; INTRAVENOUS PRN
Status: DISCONTINUED | OUTPATIENT
Start: 2023-12-20 | End: 2023-12-20

## 2023-12-20 RX ORDER — HYDROMORPHONE HCL IN WATER/PF 6 MG/30 ML
0.2 PATIENT CONTROLLED ANALGESIA SYRINGE INTRAVENOUS EVERY 5 MIN PRN
Status: DISCONTINUED | OUTPATIENT
Start: 2023-12-20 | End: 2023-12-21 | Stop reason: HOSPADM

## 2023-12-20 RX ORDER — LIDOCAINE 40 MG/G
CREAM TOPICAL
Status: DISCONTINUED | OUTPATIENT
Start: 2023-12-20 | End: 2023-12-21 | Stop reason: HOSPADM

## 2023-12-20 RX ORDER — TRAMADOL HYDROCHLORIDE 50 MG/1
50 TABLET ORAL EVERY 6 HOURS PRN
Qty: 10 TABLET | Refills: 0 | Status: SHIPPED | OUTPATIENT
Start: 2023-12-20 | End: 2023-12-23

## 2023-12-20 RX ORDER — HYDRALAZINE HYDROCHLORIDE 20 MG/ML
20 INJECTION INTRAMUSCULAR; INTRAVENOUS ONCE
Status: COMPLETED | OUTPATIENT
Start: 2023-12-20 | End: 2023-12-20

## 2023-12-20 RX ORDER — ONDANSETRON 2 MG/ML
INJECTION INTRAMUSCULAR; INTRAVENOUS PRN
Status: DISCONTINUED | OUTPATIENT
Start: 2023-12-20 | End: 2023-12-20

## 2023-12-20 RX ORDER — ACETAMINOPHEN 325 MG/1
975 TABLET ORAL ONCE
Status: COMPLETED | OUTPATIENT
Start: 2023-12-20 | End: 2023-12-20

## 2023-12-20 RX ORDER — FENTANYL CITRATE 0.05 MG/ML
25 INJECTION, SOLUTION INTRAMUSCULAR; INTRAVENOUS
Status: DISCONTINUED | OUTPATIENT
Start: 2023-12-20 | End: 2023-12-21 | Stop reason: HOSPADM

## 2023-12-20 RX ORDER — HYDROMORPHONE HCL IN WATER/PF 6 MG/30 ML
0.4 PATIENT CONTROLLED ANALGESIA SYRINGE INTRAVENOUS EVERY 5 MIN PRN
Status: DISCONTINUED | OUTPATIENT
Start: 2023-12-20 | End: 2023-12-21 | Stop reason: HOSPADM

## 2023-12-20 RX ORDER — FENTANYL CITRATE 50 UG/ML
INJECTION, SOLUTION INTRAMUSCULAR; INTRAVENOUS PRN
Status: DISCONTINUED | OUTPATIENT
Start: 2023-12-20 | End: 2023-12-20

## 2023-12-20 RX ADMIN — GLYCOPYRROLATE 0.8 MG: 0.2 INJECTION, SOLUTION INTRAMUSCULAR; INTRAVENOUS at 12:34

## 2023-12-20 RX ADMIN — Medication 40 MG: at 12:03

## 2023-12-20 RX ADMIN — SODIUM CHLORIDE, SODIUM LACTATE, POTASSIUM CHLORIDE, CALCIUM CHLORIDE: 600; 310; 30; 20 INJECTION, SOLUTION INTRAVENOUS at 11:52

## 2023-12-20 RX ADMIN — KETOROLAC TROMETHAMINE 15 MG: 30 INJECTION, SOLUTION INTRAMUSCULAR; INTRAVENOUS at 12:34

## 2023-12-20 RX ADMIN — TRAMADOL HYDROCHLORIDE 50 MG: 50 TABLET ORAL at 13:59

## 2023-12-20 RX ADMIN — PROPOFOL 180 MCG/KG/MIN: 10 INJECTION, EMULSION INTRAVENOUS at 12:03

## 2023-12-20 RX ADMIN — ACETAMINOPHEN 975 MG: 325 TABLET ORAL at 11:51

## 2023-12-20 RX ADMIN — PROPOFOL 200 MG: 10 INJECTION, EMULSION INTRAVENOUS at 12:03

## 2023-12-20 RX ADMIN — FENTANYL CITRATE 25 MCG: 0.05 INJECTION, SOLUTION INTRAMUSCULAR; INTRAVENOUS at 13:09

## 2023-12-20 RX ADMIN — GLYCOPYRROLATE 0.2 MG: 0.2 INJECTION, SOLUTION INTRAMUSCULAR; INTRAVENOUS at 12:03

## 2023-12-20 RX ADMIN — ONDANSETRON 4 MG: 2 INJECTION INTRAMUSCULAR; INTRAVENOUS at 12:03

## 2023-12-20 RX ADMIN — HYDRALAZINE HYDROCHLORIDE 10 MG: 20 INJECTION INTRAMUSCULAR; INTRAVENOUS at 13:43

## 2023-12-20 RX ADMIN — DEXAMETHASONE SODIUM PHOSPHATE 10 MG: 4 INJECTION, SOLUTION INTRA-ARTICULAR; INTRALESIONAL; INTRAMUSCULAR; INTRAVENOUS; SOFT TISSUE at 12:03

## 2023-12-20 RX ADMIN — LIDOCAINE HYDROCHLORIDE 3 ML: 20 INJECTION, SOLUTION INFILTRATION; PERINEURAL at 12:03

## 2023-12-20 RX ADMIN — FENTANYL CITRATE 100 MCG: 50 INJECTION, SOLUTION INTRAMUSCULAR; INTRAVENOUS at 12:03

## 2023-12-20 RX ADMIN — Medication 4.5 MG: at 12:34

## 2023-12-20 NOTE — ANESTHESIA PROCEDURE NOTES
Airway       Patient location during procedure: OR       Procedure Start/Stop Times: 12/20/2023 12:07 PM  Staff -        Anesthesiologist:  Terell Whitmore MD       CRNA: Chantal Sandoval APRN CRNA       Performed By: CRNAIndications and Patient Condition       Indications for airway management: bin-procedural       Induction type:intravenous       Mask difficulty assessment: 2 - vent by mask + OA or adjuvant +/- NMBA    Final Airway Details       Final airway type: endotracheal airway       Successful airway: Oral  Endotracheal Airway Details        ETT size (mm): 7.5       Cuffed: yes       Successful intubation technique: direct laryngoscopy and video laryngoscopy       VL Blade Size: Glidescope 4       Grade View of Cords: 1       Adjucts: stylet       Position: Left       Measured from: gums/teeth       Secured at (cm): 22       Bite block used: None    Post intubation assessment        Placement verified by: capnometry, equal breath sounds and chest rise        Number of attempts at approach: 2       Secured with: tape       Ease of procedure: easy       Dentition: Intact and Unchanged       Dental guard used and removed.    Medication(s) Administered   Medication Administration Time: 12/20/2023 12:07 PM

## 2023-12-20 NOTE — H&P
"PRE PROCEDURE NOTE - H & P      Chief Complaint/Reason for Procedure:              Biliary colic    Current Outpatient Medications   Medication    amLODIPine (NORVASC) 2.5 MG tablet    aspirin 81 MG EC tablet    Coenzyme Q10 (CO Q-10) 30 MG CAPS    fish oil-omega-3 fatty acids 1000 MG capsule    lisinopril (ZESTRIL) 20 MG tablet    lysine 500 MG TABS     Current Facility-Administered Medications   Medication    acetaminophen (TYLENOL) tablet 975 mg    lactated ringers infusion    lidocaine (LMX4) kit    lidocaine 1 % 0.1-1 mL    sodium chloride (PF) 0.9% PF flush 3 mL    sodium chloride (PF) 0.9% PF flush 3 mL        No Known Allergies    Past Medical History:   Diagnosis Date    Hypertension        Past Surgical History:   Procedure Laterality Date    ZZC ANESTH,EAR SURGERY      age 7       Pre-sedation assessment:            /68   Temp 96.9  F (36.1  C) (Temporal)   Resp 16   Ht 1.778 m (5' 10\")   Wt 90.7 kg (200 lb)   SpO2 96%   BMI 28.70 kg/m    General appearance: alert, appears stated age, and cooperative  Airway: No gross abnormalities appreciated that would increase risk of airway compromise.   Heart: Regular rate and rhythm  Lungs: Normal respiratory effort    ASA Classification: 2    Sedation Plan based on assessment: General     Impression:  Patient deemed adequate candidate for General anesthetic      Plan:   laparoscopic cholecystectomy      Jean Gilliland MD  12/20/2023 11:46 AM  [phone]                                "

## 2023-12-20 NOTE — ANESTHESIA POSTPROCEDURE EVALUATION
Patient: Román Drake    Procedure: Procedure(s):  CHOLECYSTECTOMY, LAPAROSCOPIC       Anesthesia Type:  General    Note:  Disposition: Outpatient   Postop Pain Control: Uneventful            Sign Out: Well controlled pain   PONV: No   Neuro/Psych: Uneventful            Sign Out: Acceptable/Baseline neuro status   Airway/Respiratory: Uneventful            Sign Out: Acceptable/Baseline resp. status   CV/Hemodynamics: Uneventful            Sign Out: Acceptable CV status; No obvious hypovolemia; No obvious fluid overload   Other NRE: NONE   DID A NON-ROUTINE EVENT OCCUR? No    Event details/Postop Comments:  Hypertension in PACU successfully treated with hydralazine           Last vitals:  Vitals Value Taken Time   /93 12/20/23 1333   Temp 97  F (36.1  C) 12/20/23 1255   Pulse 55 12/20/23 1337   Resp 16 12/20/23 1331   SpO2 96 % 12/20/23 1337   Vitals shown include unfiled device data.    Electronically Signed By: Yamil Beltran MD  December 20, 2023  1:53 PM

## 2023-12-20 NOTE — ANESTHESIA PREPROCEDURE EVALUATION
Anesthesia Pre-Procedure Evaluation    Patient: Román Drake   MRN: [id number] : 1960        Procedure : Procedure(s):  CHOLECYSTECTOMY, LAPAROSCOPIC          Past Medical History:   Diagnosis Date    Hypertension       Past Surgical History:   Procedure Laterality Date    ZZC ANESTH,EAR SURGERY      age 7      No Known Allergies   Social History     Tobacco Use    Smoking status: Never    Smokeless tobacco: Never   Substance Use Topics    Alcohol use: Not Currently      Wt Readings from Last 1 Encounters:   23 90.7 kg (200 lb)        Anesthesia Evaluation   Pt has had prior anesthetic.     No history of anesthetic complications       ROS/MED HX  ENT/Pulmonary:  - neg pulmonary ROS     Neurologic:  - neg neurologic ROS     Cardiovascular:  - neg cardiovascular ROS   (+)  hypertension- -   -  - -                                      METS/Exercise Tolerance: >4 METS    Hematologic:  - neg hematologic  ROS     Musculoskeletal:  - neg musculoskeletal ROS     GI/Hepatic:  - neg GI/hepatic ROS   (+)          cholecystitis/cholelithiasis,          Renal/Genitourinary:  - neg Renal ROS     Endo:  - neg endo ROS     Psychiatric/Substance Use:  - neg psychiatric ROS     Infectious Disease:  - neg infectious disease ROS     Malignancy:  - neg malignancy ROS     Other:  - neg other ROS          Physical Exam    Airway        Mallampati: II   TM distance: > 3 FB   Neck ROM: full   Mouth opening: > 3 cm    Respiratory Devices and Support         Dental       (+) Minor Abnormalities - some fillings, tiny chips    B=Bridge, C=Chipped, L=Loose, M=Missing    Cardiovascular   cardiovascular exam normal          Pulmonary   pulmonary exam normal        breath sounds clear to auscultation           OUTSIDE LABS:  CBC:   Lab Results   Component Value Date    WBC 8.3 2019    HGB 15.4 2019    HCT 46.3 2019     2019     BMP:   Lab Results   Component Value Date     2019    POTASSIUM  "3.7 11/03/2019    CHLORIDE 105 11/03/2019    CO2 25 11/03/2019    BUN 14 11/03/2019    CR 0.79 11/03/2019     (H) 11/03/2019     COAGS: No results found for: \"PTT\", \"INR\", \"FIBR\"  POC: No results found for: \"BGM\", \"HCG\", \"HCGS\"  HEPATIC: No results found for: \"ALBUMIN\", \"PROTTOTAL\", \"ALT\", \"AST\", \"GGT\", \"ALKPHOS\", \"BILITOTAL\", \"BILIDIRECT\", \"YESIKA\"  OTHER:   Lab Results   Component Value Date    OWEN 9.1 11/03/2019       Anesthesia Plan    ASA Status:  2    NPO Status:  NPO Appropriate    Anesthesia Type: General.     - Airway: ETT   Induction: Propofol, Intravenous.   Maintenance: TIVA.        Consents    Anesthesia Plan(s) and associated risks, benefits, and realistic alternatives discussed. Questions answered and patient/representative(s) expressed understanding.     - Discussed:     - Discussed with:  Patient            Postoperative Care    Pain management: Multi-modal analgesia.   PONV prophylaxis: Ondansetron (or other 5HT-3), Dexamethasone or Solumedrol     Comments:    Other Comments: Reviewed anesthetic options and risks, including risk of dental trauma. Patient agrees to proceed.            Terell Whitmore MD    I have reviewed the pertinent notes and labs in the chart from the past 30 days and (re)examined the patient.  Any updates or changes from those notes are reflected in this note.             # Drug Induced Platelet Defect: home medication list includes an antiplatelet medication  # Overweight: Estimated body mass index is 28.7 kg/m  as calculated from the following:    Height as of this encounter: 1.778 m (5' 10\").    Weight as of this encounter: 90.7 kg (200 lb).      "

## 2023-12-20 NOTE — ANESTHESIA CARE TRANSFER NOTE
Patient: Román Drake    Procedure: Procedure(s):  CHOLECYSTECTOMY, LAPAROSCOPIC       Diagnosis: Cholecystitis [K81.9]  Diagnosis Additional Information: No value filed.    Anesthesia Type:   General     Note:    Oropharynx: oropharynx clear of all foreign objects and spontaneously breathing  Level of Consciousness: drowsy  Oxygen Supplementation: face mask  Level of Supplemental Oxygen (L/min / FiO2): 6  Independent Airway: airway patency satisfactory and stable  Dentition: dentition unchanged  Vital Signs Stable: post-procedure vital signs reviewed and stable  Report to RN Given: handoff report given  Patient transferred to: PACU    Handoff Report: Identifed the Patient, Identified the Reponsible Provider, Reviewed the pertinent medical history, Discussed the surgical course, Reviewed Intra-OP anesthesia mangement and issues during anesthesia, Set expectations for post-procedure period and Allowed opportunity for questions and acknowledgement of understanding      Vitals:  Vitals Value Taken Time   /89 12/20/23 1256   Temp 97  F (36.1  C) 12/20/23 1255   Pulse 71 12/20/23 1255   Resp 16 12/20/23 1255   SpO2 100 % 12/20/23 1255   Pt denies pain.     Electronically Signed By: NAT Bermudez CRNA  December 20, 2023  12:58 PM

## 2023-12-20 NOTE — OP NOTE
[facility]  Operative Note    Pre-operative diagnosis: Cholecystitis [K81.9]   Post-operative diagnosis biliary colic   Procedure: Procedure(s):  CHOLECYSTECTOMY, LAPAROSCOPIC   Surgeon: Jean Gilliland MD   Assistants(s): Tamie King PA-C.  Laparoscope operation   Anesthesia: General Anesthetic    Estimated blood loss: 10 mL                Drains: None   Specimens: Gallbladder       Findings: None.   Complications: None.           Description of procedure:      The patient was brought to the operating room where after induction of general anesthesia with endotracheal and the patient was positioned with the left arm tucked and right arm out.  The patient was then prepped and draped in standard sterile fashion.  After a procedural pause we began by injecting quarter percent Marcaine into the skin immediately adjacent to the umbilicus.  This was then sharply incised.  We then entered with a 5 mm optical trochar.  The patient was then placed in reverse Trendelenburg and right side up the body positioning.  We then proceeded to place 3 additional trochars across the right subcostal margin.      On initial evaluation the gallbladder it appeared distended but not inflamed. .   We began our operation by grasping the fundus the gallbladder and retracting it cephalad over the dome of the liver.  The neck of the gallbladder was then retracted lateral to the right side.  We then began by scoring the peritoneum overlying the triangle of Calot. Using primarily blunt dissection and electrocautery we proceeded to clear the fatty tissues and lymph node away from the triangle of Calot. The cystic duct and cystic artery were skeletonized. A critical view was obtained.  We then proceeded to place three clips each on the cystic artery and duct, which were then divided.  We then utilized electrocautery to dissect the remainder of the gallbladder off the gallbladder fossa. Once this was completely removed we inspected the  gallbladder fossa for hemostasis which appeared excellent. The gallbladder was then placed into an Endo Catch bag. All spilled fluid and blood were then aspirated clear from the right upper quadrant and after confirming no active bleeding from the gallbladder fossa the Endo Catch bag with the gallbladder intact was removed through the 12 mm port site. An 0 Vicryl stitch was then placed under laparoscopic guidance in the 12 mm port site.  We then desufflated the abdomen and removed our ports. The preplaced fascial tie was then tied down with excellent obliteration of the fascial defect. The remainder of the local anesthetic was then injected in the skin and fascia surrounding the port sites and the skin closed with running 4-0 subcuticular sutures      Jean Gilliland MD, FACS  Office: [phone]  [facility]   General and Bariatric Surgery

## 2023-12-20 NOTE — DISCHARGE INSTRUCTIONS
You have received 975 mg of Acetaminophen (Tylenol) at 11:51. Please do not take an additional dose of Tylenol until after 5:51 pm     Do not exceed 4,000 mg of acetaminophen during a 24 hour period and keep in mind that acetaminophen can also be found in many over-the-counter cold medications as well as narcotics that may be given for pain.     You received a medication called Toradol (a stronger IV ibuprofen) at 12:34. Do NOT take any Ibuprofen / Advil / Aleve / Naproxen or products containing Ibuprofen until 6:34 pm or later.     50mg of tramadol given at 2pm.  Next available at 8pm.     If you have any questions or concerns regarding your procedure, please contact Dr. Gilliland, his office number is [phone].     [facility]   Adult Discharge Orders & Instructions     For 24 hours after surgery    Get plenty of rest.  A responsible adult must stay with you for at least 24 hours after you leave the hospital.   Do not drive or use heavy equipment.  If you have weakness or tingling, don't drive or use heavy equipment until this feeling goes away.  Do not drink alcohol.  Avoid strenuous or risky activities.  Ask for help when climbing stairs.   You may feel lightheaded.  IF so, sit for a few minutes before standing.  Have someone help you get up.   If you have nausea (feel sick to your stomach): Drink only clear liquids such as apple juice, ginger ale, broth or 7-Up.  Rest may also help.  Be sure to drink enough fluids.  Move to a regular diet as you feel able.  You may have a slight fever. Call the doctor if your fever is over 100 F (37.7 C) (taken under the tongue) or lasts longer than 24 hours.  You may have a dry mouth, a sore throat, muscle aches or trouble sleeping.  These should go away after 24 hours.  Do not make important or legal decisions.   Call your doctor for any of the followin.  Signs of infection (fever, growing tenderness at the surgery site, a large amount of drainage or  bleeding, severe pain, foul-smelling drainage, redness, swelling).    2. It has been over 8 to 10 hours since surgery and you are still not able to urinate (pass water).    3.  Headache for over 24 hours.

## 2024-01-07 ENCOUNTER — HEALTH MAINTENANCE LETTER (OUTPATIENT)
Age: 64
End: 2024-01-07

## 2025-01-25 ENCOUNTER — HEALTH MAINTENANCE LETTER (OUTPATIENT)
Age: 65
End: 2025-01-25

## 2025-07-22 DIAGNOSIS — C61 PROSTATE CANCER (H): Primary | ICD-10-CM
